# Patient Record
Sex: FEMALE | Race: WHITE | NOT HISPANIC OR LATINO | Employment: FULL TIME | URBAN - METROPOLITAN AREA
[De-identification: names, ages, dates, MRNs, and addresses within clinical notes are randomized per-mention and may not be internally consistent; named-entity substitution may affect disease eponyms.]

---

## 2023-07-08 ENCOUNTER — HOSPITAL ENCOUNTER (EMERGENCY)
Facility: CLINIC | Age: 28
Discharge: HOME | End: 2023-07-08
Attending: EMERGENCY MEDICINE | Admitting: EMERGENCY MEDICINE
Payer: COMMERCIAL

## 2023-07-08 VITALS
OXYGEN SATURATION: 100 % | HEIGHT: 63 IN | DIASTOLIC BLOOD PRESSURE: 79 MMHG | HEART RATE: 104 BPM | TEMPERATURE: 98.7 F | WEIGHT: 215 LBS | BODY MASS INDEX: 38.09 KG/M2 | SYSTOLIC BLOOD PRESSURE: 138 MMHG | RESPIRATION RATE: 14 BRPM

## 2023-07-08 DIAGNOSIS — F10.920 ALCOHOLIC INTOXICATION WITHOUT COMPLICATION (H): ICD-10-CM

## 2023-07-08 DIAGNOSIS — R41.82 ALTERED MENTAL STATUS, UNSPECIFIED ALTERED MENTAL STATUS TYPE: ICD-10-CM

## 2023-07-08 LAB
ALBUMIN SERPL BCG-MCNC: 4.5 G/DL (ref 3.5–5.2)
ALP SERPL-CCNC: 64 U/L (ref 35–104)
ALT SERPL W P-5'-P-CCNC: 21 U/L (ref 0–50)
ANION GAP SERPL CALCULATED.3IONS-SCNC: 19 MMOL/L (ref 7–15)
APAP SERPL-MCNC: <5 UG/ML (ref 10–30)
AST SERPL W P-5'-P-CCNC: 34 U/L (ref 0–45)
BASOPHILS # BLD MANUAL: 0 10E3/UL (ref 0–0.2)
BASOPHILS NFR BLD MANUAL: 0 %
BILIRUB SERPL-MCNC: <0.2 MG/DL
BUN SERPL-MCNC: 8.2 MG/DL (ref 6–20)
CALCIUM SERPL-MCNC: 9 MG/DL (ref 8.6–10)
CHLORIDE SERPL-SCNC: 104 MMOL/L (ref 98–107)
CREAT SERPL-MCNC: 0.58 MG/DL (ref 0.51–0.95)
DEPRECATED HCO3 PLAS-SCNC: 16 MMOL/L (ref 22–29)
EOSINOPHIL # BLD MANUAL: 0.3 10E3/UL (ref 0–0.7)
EOSINOPHIL NFR BLD MANUAL: 2 %
ERYTHROCYTE [DISTWIDTH] IN BLOOD BY AUTOMATED COUNT: 13.5 % (ref 10–15)
ETHANOL SERPL-MCNC: 0.37 G/DL
GFR SERPL CREATININE-BSD FRML MDRD: >90 ML/MIN/1.73M2
GLUCOSE SERPL-MCNC: 122 MG/DL (ref 70–99)
HCT VFR BLD AUTO: 46.1 % (ref 35–47)
HGB BLD-MCNC: 15.4 G/DL (ref 11.7–15.7)
HOLD SPECIMEN: NORMAL
LIPASE SERPL-CCNC: 31 U/L (ref 13–60)
LYMPHOCYTES # BLD MANUAL: 6.8 10E3/UL (ref 0.8–5.3)
LYMPHOCYTES NFR BLD MANUAL: 51 %
MCH RBC QN AUTO: 31.8 PG (ref 26.5–33)
MCHC RBC AUTO-ENTMCNC: 33.4 G/DL (ref 31.5–36.5)
MCV RBC AUTO: 95 FL (ref 78–100)
MONOCYTES # BLD MANUAL: 0.9 10E3/UL (ref 0–1.3)
MONOCYTES NFR BLD MANUAL: 7 %
NEUTROPHILS # BLD MANUAL: 5.4 10E3/UL (ref 1.6–8.3)
NEUTROPHILS NFR BLD MANUAL: 40 %
PLAT MORPH BLD: ABNORMAL
PLATELET # BLD AUTO: 553 10E3/UL (ref 150–450)
POTASSIUM SERPL-SCNC: 4.4 MMOL/L (ref 3.4–5.3)
PROT SERPL-MCNC: 8 G/DL (ref 6.4–8.3)
RBC # BLD AUTO: 4.85 10E6/UL (ref 3.8–5.2)
RBC MORPH BLD: ABNORMAL
SALICYLATES SERPL-MCNC: <0.3 MG/DL
SODIUM SERPL-SCNC: 139 MMOL/L (ref 136–145)
VARIANT LYMPHS BLD QL SMEAR: PRESENT
WBC # BLD AUTO: 13.4 10E3/UL (ref 4–11)

## 2023-07-08 PROCEDURE — 85027 COMPLETE CBC AUTOMATED: CPT | Performed by: EMERGENCY MEDICINE

## 2023-07-08 PROCEDURE — 36415 COLL VENOUS BLD VENIPUNCTURE: CPT | Performed by: EMERGENCY MEDICINE

## 2023-07-08 PROCEDURE — 80143 DRUG ASSAY ACETAMINOPHEN: CPT | Performed by: EMERGENCY MEDICINE

## 2023-07-08 PROCEDURE — 99283 EMERGENCY DEPT VISIT LOW MDM: CPT | Mod: 25

## 2023-07-08 PROCEDURE — 82077 ASSAY SPEC XCP UR&BREATH IA: CPT | Performed by: EMERGENCY MEDICINE

## 2023-07-08 PROCEDURE — 258N000003 HC RX IP 258 OP 636: Performed by: EMERGENCY MEDICINE

## 2023-07-08 PROCEDURE — 80179 DRUG ASSAY SALICYLATE: CPT | Performed by: EMERGENCY MEDICINE

## 2023-07-08 PROCEDURE — 96360 HYDRATION IV INFUSION INIT: CPT

## 2023-07-08 PROCEDURE — 80053 COMPREHEN METABOLIC PANEL: CPT | Performed by: EMERGENCY MEDICINE

## 2023-07-08 PROCEDURE — 83690 ASSAY OF LIPASE: CPT | Performed by: EMERGENCY MEDICINE

## 2023-07-08 PROCEDURE — 85007 BL SMEAR W/DIFF WBC COUNT: CPT | Performed by: EMERGENCY MEDICINE

## 2023-07-08 RX ORDER — LAMOTRIGINE 150 MG/1
150 TABLET ORAL 2 TIMES DAILY
COMMUNITY
Start: 2022-04-04

## 2023-07-08 RX ORDER — KETOCONAZOLE 20 MG/G
2 CREAM TOPICAL 2 TIMES DAILY
COMMUNITY
Start: 2022-04-04

## 2023-07-08 RX ORDER — SPIRONOLACTONE 50 MG/1
50 TABLET, FILM COATED ORAL 2 TIMES DAILY
COMMUNITY

## 2023-07-08 RX ORDER — CLONAZEPAM 0.5 MG/1
0.5 TABLET ORAL 3 TIMES DAILY PRN
COMMUNITY
Start: 2022-04-04

## 2023-07-08 RX ORDER — TRAZODONE HYDROCHLORIDE 50 MG/1
0.5 TABLET, FILM COATED ORAL AT BEDTIME
COMMUNITY
Start: 2022-04-04

## 2023-07-08 RX ORDER — RISPERIDONE 0.5 MG/1
0.5 TABLET ORAL DAILY
COMMUNITY
Start: 2022-04-04

## 2023-07-08 RX ADMIN — SODIUM CHLORIDE 1000 ML: 9 INJECTION, SOLUTION INTRAVENOUS at 02:48

## 2023-07-08 ASSESSMENT — ACTIVITIES OF DAILY LIVING (ADL)
ADLS_ACUITY_SCORE: 35

## 2023-07-08 NOTE — ED PROVIDER NOTES
ED ATTENDING PHYSICIAN NOTE:   I evaluated this patient in conjunction with Celsa Mathews PA-C  I have participated in the care of the patient and personally performed key elements of the history, exam, and medical decision making.      HPI:   Lola Patterson is a 28 year old female who presents to the ED via EMS for alcohol intoxication. EMS report the patient was out drinking with friends when the patient was acting increasingly more altered, prompting the friend to call EMS. On arrival, EMS found patient on the floor. She was given Versed and Zofran en route. The patient denies plans of self harm or drug use. Patient is quite somnolent on initial evaluation.     Independent Historian:   EMS - They report patient history.     EXAM:   General: sleeping but wakes to loud voice   Head: The scalp, face, and head appear normal. No signs of trauma   Eyes: The pupils are equal, round, and reactive to light. Conjunctivae and sclerae are normal  ENT: External acoustic canals are normal. The oropharynx is normal without erythema. Uvula is in the midline  Neck: Normal range of motion.   CV: Regular rate and rhythm.   Resp: Lungs are clear without wheezes or rales. No respiratory distress.   GI: Abdomen is soft, no rigidity, guarding, or rebound. No distension. No tenderness to palpation in any quadrant.    MS: Normal tone. Joints grossly normal without effusions. No asymmetric leg swelling, calf or thigh tenderness.    Skin: No rash or lesions noted. Normal capillary refill noted  Neuro: somnolent but wakes to voice and follows commands. Speech is slurred speech. Face is symmetric. Moving all extremities.   Psych:  Normal affect.  Appropriate interactions.    Independent Interpretation (X-rays, CTs, rhythm strip):  None    MEDICAL DECISION MAKING/ASSESSMENT AND PLAN:   Lola Patterson is a 28 year old female who presents for evaluation of altered mental status.  She is intoxicated here in ED and this is the most likely etiology  for their AMS.  Nonetheless a broad differential diagnosis was considered for the altered state including drug ingestion, infection, intracerebral issues, metabolic derangements, psychiatric decompensation.  Her head to toe exam is normal except for signs of alcohol intoxication. Blood work otherwise looks ok; no signs of alcoholic ketoacidosis, significant liver impairment or acute alcoholic hepatitis.  She has no history of DT's or alcohol withdrawal seizures. There are no signs of co-ingestion including acetaminophen, drugs, medications, volatile alcohols.  She has no signs of trauma related to alcohol use and no further workup is needed including head CT. Will require sober reassessment. Likely discharge once she metabolizes.     DIAGNOSIS:     ICD-10-CM    1. Alcoholic intoxication without complication (H)  F10.920       2. Altered mental status, unspecified altered mental status type  R41.82           DISPOSITION:   Signed out to oncoming ED provider pending sober reassessment      Scribe Disclosure:  I, Tahir Kern, am serving as a scribe at 1:13 AM on 7/8/2023 to document services personally performed by Terry Moore MD based on my observations and the provider's statements to me.      7/8/2023  [facility] EMERGENCY DEPT     Terry Moore MD  07/08/23 0522

## 2023-07-08 NOTE — ED NOTES
Bed: ED28  Expected date:   Expected time:   Means of arrival:   Comments:  MH - 28F ETOH ETA 00:00

## 2023-07-08 NOTE — ED TRIAGE NOTES
Patient was out drinking with friend whom called EMS, as patient was acting increasingly more altered. When EMS arrived, patient was on the floor as her friends helped lower her to the ground. Patient was anxious per EMS so that had given 1 mg versed and 4 mg zofran. Patient is maintaining her airway and answering questions appropriately but is quite lethargic. Per EMS, patient blew 0.27.

## 2023-07-08 NOTE — DISCHARGE INSTRUCTIONS
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
## 2023-07-08 NOTE — ED NOTES
Patient up at door attempted to exit from opposite end. Patient tearful, urinated on floor and appears to have a bloody nose. Patient does not know how she got the bloody nose. Provided patient with selma care, clean, disposable scrubs, water, socks, and fresh linen. Patient unsteady and still confused. Assisted patient back to bed to continue to rest.

## 2023-09-13 ENCOUNTER — TRANSFERRED RECORDS (OUTPATIENT)
Dept: HEALTH INFORMATION MANAGEMENT | Facility: CLINIC | Age: 28
End: 2023-09-13
Payer: COMMERCIAL

## 2023-09-20 ENCOUNTER — MEDICAL CORRESPONDENCE (OUTPATIENT)
Dept: HEALTH INFORMATION MANAGEMENT | Facility: CLINIC | Age: 28
End: 2023-09-20
Payer: COMMERCIAL

## 2023-09-21 ENCOUNTER — TRANSCRIBE ORDERS (OUTPATIENT)
Dept: OTHER | Age: 28
End: 2023-09-21

## 2023-09-21 DIAGNOSIS — R79.89 ELEVATED PROLACTIN LEVEL: Primary | ICD-10-CM

## 2023-10-22 ENCOUNTER — HEALTH MAINTENANCE LETTER (OUTPATIENT)
Age: 28
End: 2023-10-22

## 2024-09-19 ENCOUNTER — TRANSFERRED RECORDS (OUTPATIENT)
Dept: HEALTH INFORMATION MANAGEMENT | Facility: CLINIC | Age: 29
End: 2024-09-19
Payer: COMMERCIAL

## 2024-10-04 ENCOUNTER — TRANSFERRED RECORDS (OUTPATIENT)
Dept: HEALTH INFORMATION MANAGEMENT | Facility: CLINIC | Age: 29
End: 2024-10-04
Payer: COMMERCIAL

## 2024-10-08 ENCOUNTER — MEDICAL CORRESPONDENCE (OUTPATIENT)
Dept: HEALTH INFORMATION MANAGEMENT | Facility: CLINIC | Age: 29
End: 2024-10-08
Payer: COMMERCIAL

## 2024-10-08 ENCOUNTER — TRANSCRIBE ORDERS (OUTPATIENT)
Dept: OTHER | Age: 29
End: 2024-10-08

## 2024-10-08 DIAGNOSIS — R79.89 ELEVATED PROLACTIN LEVEL: Primary | ICD-10-CM

## 2024-12-15 ENCOUNTER — HEALTH MAINTENANCE LETTER (OUTPATIENT)
Age: 29
End: 2024-12-15

## 2025-01-14 ENCOUNTER — VIRTUAL VISIT (OUTPATIENT)
Dept: ENDOCRINOLOGY | Facility: CLINIC | Age: 30
End: 2025-01-14
Attending: ADVANCED PRACTICE MIDWIFE
Payer: COMMERCIAL

## 2025-01-14 VITALS — BODY MASS INDEX: 36.67 KG/M2 | WEIGHT: 207 LBS

## 2025-01-14 DIAGNOSIS — R79.89 ELEVATED PROLACTIN LEVEL: ICD-10-CM

## 2025-01-14 ASSESSMENT — PAIN SCALES - GENERAL: PAINLEVEL_OUTOF10: NO PAIN (0)

## 2025-01-14 NOTE — LETTER
"1/14/2025      Lola Patterson  [id number]  Replaced by [facility] [id number]      Dear Colleague,    Thank you for referring your patient, Lola Patterson, to the [facility]. Please see a copy of my visit note below.    THIS IS A VIDEO VISIT:    Phone call visit/virtual visit encounter:    Name of patient: Lola Patterson    Date of encounter: 1/14/2025    Time of start of video visit: 11:30    Video started: 11:41    Video ended: 11:51    Provider location: working from home/ [facility]    Patient location: patients home.    Mode of transmission: [employer]AM[employer] video/ [employer]    Verbal consent: obtained before starting visit. Pt is agreeable.      The patient has been notified of following:      \"This VIDEO visit will be conducted via a call between you and your physician/provider. We have found that certain health care needs can be provided without the need for a physical exam.  This service lets us provide the care you need with a short phone conversation.  If a prescription is necessary we can send it directly to your pharmacy.  If lab work is needed we can place an order for that and you can then stop by our lab to have the test done at a later time.     With new updates with corona virus patient might be billed as clinic visit.     If during the course of the call the physician/provider feels a telephone visit is not appropriate, you will not be charged for this service.\"      Past medical history, social history, family history, allergy and medications were reviewed and updated as appropriate.  Reviewed pertinent labs, notes, imaging studies personally.    Name: Lola Patterson  Seen in consultation with Linda Chilel APRN CNM  for elevated prolactin.  HPI:  Lola Patterson is a 30 year old female who presents for the evaluation of hyperprolactinemia .  Referring provider Linda Chilel APRN CNM is outside [place].  Available records, labs and images from outside clinic were personally " reviewed.    H/o PCOS and amenorrhea. She is on spironolactone.  Noted high prolactin X 2 ( in 2023 level was 84.3 and in 2024 was 60.1)      Breast discharge: no. In 2023 has one time breast discharge  No breast enlargement or pain.  No FH of breast Ca.  Menses: irregular. LMP Dec 2021. She is on Slynd (Drospirenone) and she is on it since 2022.  Changes in periods: irrergular  Pregnancy desire: No plans. No kids  Changes in vision: sometimes blurry. Wears glasses. No peripheral vision problems.  Headaches: present. Frontal HA. No h/o migraines  Medications: RISPERIDONE since 2021 for bipolar disorder.  She has metal in leg. H/o femur fracture.  PMH/PSH:  No past medical history on file.  No past surgical history on file.  Family Hx:  No family history on file.    Social Hx:  Social History     Socioeconomic History     Marital status: Single     Spouse name: Not on file     Number of children: Not on file     Years of education: Not on file     Highest education level: Not on file   Occupational History     Not on file   Tobacco Use     Smoking status: Former     Current packs/day: 0.00     Types: Cigarettes     Quit date: 10/1/2021     Years since quitting: 3.2     Passive exposure: Never     Smokeless tobacco: Former     Quit date: 5/6/2023   Substance and Sexual Activity     Alcohol use: Not on file     Drug use: Not on file     Sexual activity: Not on file   Other Topics Concern     Not on file   Social History Narrative     Not on file     Social Drivers of Health     Financial Resource Strain: Not on file   Food Insecurity: Not on file   Transportation Needs: Not on file   Physical Activity: Not on file   Stress: Not on file   Social Connections: Unknown (4/4/2022)    [facility]    Social Connections      Frequency of Communication with Friends and Family: Not on file   Interpersonal Safety: Not on file   Housing  "Stability: Not on file          MEDICATIONS:  has a current medication list which includes the following prescription(s): drospirenone, lamotrigine, risperidone, spironolactone, clonazepam, ketoconazole, and trazodone.      ROS     ROS: 10 point ROS neg other than the symptoms noted above in the HPI.    Physical Exam   VS: Wt 93.9 kg (207 lb)   BMI 36.67 kg/m    GENERAL: healthy, alert and no distress  EYES: Eyes grossly normal to inspection, conjunctivae and sclerae normal  ENT: no nose swelling, nasal discharge.  Thyroid: no apparent thyroid nodules  RESP: no audible wheeze, cough, or visible cyanosis.  No visible retractions or increased work of breathing.  Able to speak fully in complete sentences.  ABDO: not evaluated.  EXTREMITIES: no hand tremors.  NEURO: Cranial nerves grossly intact, mentation intact and speech normal  SKIN: No apparent skin lesions, rash or edema seen   PSYCH: mentation appears normal, affect normal/bright, judgement and insight intact, normal speech and appearance well-groomed    LABS:    All pertinent notes, labs, and images personally reviewed by me.     PROLACTIN:  high prolactin X 2 ( in 2023 level was 84.3 and in 2024 was 60.1)    TFTs:  No results found for: \"TSH\"        A/P  Ms.Rachel ARLEEN Patterson is a 30 year old here for the evaluation of elevated prolactin:    Elevated prolactin level:  Comment: H/o PCOS and amenorrhea. She is on spironolactone.  Noted high prolactin X 2 ( in 2023 level was 84.3 and in 2024 was 60.1)  She is on risperidone for last few years.  No breast pain, breast discharge  Plan:   Discussed diagnosis, pathophysiology, management and treatment options of condition with pt.  I discussed hyperprolactinemia with patient.  Risperidone can cause high prolactin level.  Clinically no major concerns.  Plan to check labs as noted below  Based on that consider MRI (of note she has metal in her leg a for femur fracture)  Consider talking to psychiatrist about switching to " different medication    Plan: TSH with free T4 reflex, Prolactin          Prolactin secreting pituitary tumors are the most common type of hormone secreting pituitary tumors.     Causes: Hypothyroidism can be associated with hyperprolactinemia. Hypothyroidism results in increased TRH production, then TRH (which can act as a PRF) could lead to hyperprolactinemia. Estrogens act directly at the pituitary level, causing stimulation of lactotrophs, and thus enhance prolactin secretion. Injury to the chest wall can also lead to hyperprolactinemia; this results from abnormal stimulation of the reflex associated with the rise in prolactin seen in suckling. Certain drugs act as dopamine-receptor-blocking agents, including phenothiazines (e.g. chlorpromazine), butyrophenones (haloperidol), and benzamides (metoclopramide, sulpiride, and domperidone). These drugs block the effects of endogenous dopamine and thus release lactotrophs from their hypothalamic inhibition. Pituitary or stalk tumors with abnormal blood supplies, or their pressure effects, may interfere with the circulatory pathway from the hypothalamus down the pituitary stalk to the normal lactotrophs or a tumor, producing effective dopamine deficiency due to a functional stalk section. Diseases of the hypothalamus, such as tumors, arterio-venous malformations, and inflammatory processes such as sarcoidosis result in either diminished synthesis or release of dopamine. Certain drugs (e.g. alpha-methyldopa and reserpine) are capable of depleting the central dopamine stores.     The symptoms associated with hyperprolactinemia may be due to several factors: the direct effects of excess prolactin à galactorrhea or hypogonadism; the effects of the structural lesion causing the disorder (i.e. the pituitary tumor) à  headaches, visual field defects, or external ophthalmoplegia; or associated dysfunction of secretion of other anterior pituitary hormones.     Women with  hyperprolactinemia usually present with menstrual abnormalities - amenorrhea or oligomenorrhea - or regular cycles with infertility or menorrhagia. Men often present late in the course of the disease with symptoms of expansion of their pituitary tumor (i.e. headaches, visual defects, and external ophthalmoplegia) or symptoms from secondary adrenal or thyroid failure.     A microadenoma has a maximum diameter of up to 10mm, and a macroadenoma as having a diameter > 10mm. A microadenoma has serum prolactin levels below 200ng/mL.  A macroadenoma that secretes prolactin is usually associated with a serum prolactin level of more than 200ng/mL.    When serum prolactin is evaluated by two-site immunometric assays, large amounts of prolactin saturate both the capture and the signal antibodies, impairing their binding, causing serum prolactin to be underestimated (hook effect).   In order to avoid unnecessary surgery (treatment of choice for nonfunctioning tumors), prolactin assays with serum dilution are recommended in patients with macroadenomas who may harbor a prolactinoma.    The anatomy of the pituitary is optimally assessed by MRI. MRI allows imaging of the optic chiasm, the cavernous sinuses, the pituitary (both the normal gland and tumors), and its stalk. MRI allows accurate measurement of the size of the pituitary and of any tumor and its relationship to the optic chiasm and cavernous sinuses.     Cabergoline has an extremely long biological half life and thus only needs to be administered either once or twice per week at a dose of 0.5 mg/dose.  Cabergoline is generally better tolerated than bromocriptine, so increasing the patient's adherence. Therefore, cabergoline is currently considered the first choice drug for the treatment of prolactinomas, except for patients wishing pregnancy in the short-term. Bromocriptine reduces pituitary tumor size in 75-80% of patients with large prolactin-secreting tumors, even with  gross extrasellar extension.    Discussed indications, risks and benefits of all medications prescribed, and answered questions to patient's satisfaction.  The longitudinal plan of care for the diagnosis(es)/condition(s) as documented were addressed during this visit. Due to the added complexity in care, I will continue to support Lola in the subsequent management and with ongoing continuity of care.  All questions were answered.  The patient indicates understanding of the above issues and agrees with the plan set forth.      Follow-up:  As noted in AVS    Jazz Patel MD  Endocrinology   [facility]  CC: No Ref-Primary, Physician            Again, thank you for allowing me to participate in the care of your patient.        Sincerely,        Jazz Patel MD    Electronically signed

## 2025-01-14 NOTE — PROGRESS NOTES
"THIS IS A VIDEO VISIT:    Phone call visit/virtual visit encounter:    Name of patient: Lola Patterson    Date of encounter: 1/14/2025    Time of start of video visit: 11:30    Video started: 11:41    Video ended: 11:51    Provider location: working from home/ [facility]    Patient location: patients home.    Mode of transmission: [employer] video/ [employer]    Verbal consent: obtained before starting visit. Pt is agreeable.      The patient has been notified of following:      \"This VIDEO visit will be conducted via a call between you and your physician/provider. We have found that certain health care needs can be provided without the need for a physical exam.  This service lets us provide the care you need with a short phone conversation.  If a prescription is necessary we can send it directly to your pharmacy.  If lab work is needed we can place an order for that and you can then stop by our lab to have the test done at a later time.     With new updates with corona virus patient might be billed as clinic visit.     If during the course of the call the physician/provider feels a telephone visit is not appropriate, you will not be charged for this service.\"      Past medical history, social history, family history, allergy and medications were reviewed and updated as appropriate.  Reviewed pertinent labs, notes, imaging studies personally.    Name: Lola Patterson  Seen in consultation with Linda Chilel APRN CNM  for elevated prolactin.  HPI:  Lola Patterson is a 30 year old female who presents for the evaluation of hyperprolactinemia .  Referring provider Linda Chilel APRN CNM is outside [place].  Available records, labs and images from outside clinic were personally reviewed.    H/o PCOS and amenorrhea. She is on spironolactone.  Noted high prolactin X 2 ( in 2023 level was 84.3 and in 2024 was 60.1)      Breast discharge: no. In 2023 has one time breast discharge  No breast enlargement or pain.  No FH of " breast Ca.  Menses: irregular. LMP Dec 2021. She is on Slynd (Drospirenone) and she is on it since 2022.  Changes in periods: irrergular  Pregnancy desire: No plans. No kids  Changes in vision: sometimes blurry. Wears glasses. No peripheral vision problems.  Headaches: present. Frontal HA. No h/o migraines  Medications: RISPERIDONE since 2021 for bipolar disorder.  She has metal in leg. H/o femur fracture.  PMH/PSH:  No past medical history on file.  No past surgical history on file.  Family Hx:  No family history on file.    Social Hx:  Social History     Socioeconomic History    Marital status: Single     Spouse name: Not on file    Number of children: Not on file    Years of education: Not on file    Highest education level: Not on file   Occupational History    Not on file   Tobacco Use    Smoking status: Former     Current packs/day: 0.00     Types: Cigarettes     Quit date: 10/1/2021     Years since quitting: 3.2     Passive exposure: Never    Smokeless tobacco: Former     Quit date: 5/6/2023   Substance and Sexual Activity    Alcohol use: Not on file    Drug use: Not on file    Sexual activity: Not on file   Other Topics Concern    Not on file   Social History Narrative    Not on file     Social Drivers of Health     Financial Resource Strain: Not on file   Food Insecurity: Not on file   Transportation Needs: Not on file   Physical Activity: Not on file   Stress: Not on file   Social Connections: Unknown (4/4/2022)    [facility]    Social Connections     Frequency of Communication with Friends and Family: Not on file   Interpersonal Safety: Not on file   Housing Stability: Not on file          MEDICATIONS:  has a current medication list which includes the following prescription(s): drospirenone, lamotrigine, risperidone, spironolactone, clonazepam, ketoconazole, and trazodone.      ROS     ROS: 10 point ROS neg other  "than the symptoms noted above in the HPI.    Physical Exam   VS: Wt 93.9 kg (207 lb)   BMI 36.67 kg/m    GENERAL: healthy, alert and no distress  EYES: Eyes grossly normal to inspection, conjunctivae and sclerae normal  ENT: no nose swelling, nasal discharge.  Thyroid: no apparent thyroid nodules  RESP: no audible wheeze, cough, or visible cyanosis.  No visible retractions or increased work of breathing.  Able to speak fully in complete sentences.  ABDO: not evaluated.  EXTREMITIES: no hand tremors.  NEURO: Cranial nerves grossly intact, mentation intact and speech normal  SKIN: No apparent skin lesions, rash or edema seen   PSYCH: mentation appears normal, affect normal/bright, judgement and insight intact, normal speech and appearance well-groomed    LABS:    All pertinent notes, labs, and images personally reviewed by me.     PROLACTIN:  high prolactin X 2 ( in 2023 level was 84.3 and in 2024 was 60.1)    TFTs:  No results found for: \"TSH\"        A/P  Ms.Rachel ARLEEN Patterson is a 30 year old here for the evaluation of elevated prolactin:    Elevated prolactin level:  Comment: H/o PCOS and amenorrhea. She is on spironolactone.  Noted high prolactin X 2 ( in 2023 level was 84.3 and in 2024 was 60.1)  She is on risperidone for last few years.  No breast pain, breast discharge  Plan:   Discussed diagnosis, pathophysiology, management and treatment options of condition with pt.  I discussed hyperprolactinemia with patient.  Risperidone can cause high prolactin level.  Clinically no major concerns.  Plan to check labs as noted below  Based on that consider MRI (of note she has metal in her leg a for femur fracture)  Consider talking to psychiatrist about switching to different medication    Plan: TSH with free T4 reflex, Prolactin          Prolactin secreting pituitary tumors are the most common type of hormone secreting pituitary tumors.     Causes: Hypothyroidism can be associated with hyperprolactinemia. Hypothyroidism " results in increased TRH production, then TRH (which can act as a PRF) could lead to hyperprolactinemia. Estrogens act directly at the pituitary level, causing stimulation of lactotrophs, and thus enhance prolactin secretion. Injury to the chest wall can also lead to hyperprolactinemia; this results from abnormal stimulation of the reflex associated with the rise in prolactin seen in suckling. Certain drugs act as dopamine-receptor-blocking agents, including phenothiazines (e.g. chlorpromazine), butyrophenones (haloperidol), and benzamides (metoclopramide, sulpiride, and domperidone). These drugs block the effects of endogenous dopamine and thus release lactotrophs from their hypothalamic inhibition. Pituitary or stalk tumors with abnormal blood supplies, or their pressure effects, may interfere with the circulatory pathway from the hypothalamus down the pituitary stalk to the normal lactotrophs or a tumor, producing effective dopamine deficiency due to a functional stalk section. Diseases of the hypothalamus, such as tumors, arterio-venous malformations, and inflammatory processes such as sarcoidosis result in either diminished synthesis or release of dopamine. Certain drugs (e.g. alpha-methyldopa and reserpine) are capable of depleting the central dopamine stores.     The symptoms associated with hyperprolactinemia may be due to several factors: the direct effects of excess prolactin à galactorrhea or hypogonadism; the effects of the structural lesion causing the disorder (i.e. the pituitary tumor) à  headaches, visual field defects, or external ophthalmoplegia; or associated dysfunction of secretion of other anterior pituitary hormones.     Women with hyperprolactinemia usually present with menstrual abnormalities - amenorrhea or oligomenorrhea - or regular cycles with infertility or menorrhagia. Men often present late in the course of the disease with symptoms of expansion of their pituitary tumor (i.e.  headaches, visual defects, and external ophthalmoplegia) or symptoms from secondary adrenal or thyroid failure.     A microadenoma has a maximum diameter of up to 10mm, and a macroadenoma as having a diameter > 10mm. A microadenoma has serum prolactin levels below 200ng/mL.  A macroadenoma that secretes prolactin is usually associated with a serum prolactin level of more than 200ng/mL.    When serum prolactin is evaluated by two-site immunometric assays, large amounts of prolactin saturate both the capture and the signal antibodies, impairing their binding, causing serum prolactin to be underestimated (hook effect).   In order to avoid unnecessary surgery (treatment of choice for nonfunctioning tumors), prolactin assays with serum dilution are recommended in patients with macroadenomas who may harbor a prolactinoma.    The anatomy of the pituitary is optimally assessed by MRI. MRI allows imaging of the optic chiasm, the cavernous sinuses, the pituitary (both the normal gland and tumors), and its stalk. MRI allows accurate measurement of the size of the pituitary and of any tumor and its relationship to the optic chiasm and cavernous sinuses.     Cabergoline has an extremely long biological half life and thus only needs to be administered either once or twice per week at a dose of 0.5 mg/dose.  Cabergoline is generally better tolerated than bromocriptine, so increasing the patient's adherence. Therefore, cabergoline is currently considered the first choice drug for the treatment of prolactinomas, except for patients wishing pregnancy in the short-term. Bromocriptine reduces pituitary tumor size in 75-80% of patients with large prolactin-secreting tumors, even with gross extrasellar extension.    Discussed indications, risks and benefits of all medications prescribed, and answered questions to patient's satisfaction.  The longitudinal plan of care for the diagnosis(es)/condition(s) as documented were addressed during  this visit. Due to the added complexity in care, I will continue to support Lola in the subsequent management and with ongoing continuity of care.  All questions were answered.  The patient indicates understanding of the above issues and agrees with the plan set forth.      Follow-up:  As noted in AVS    Jazz Patel MD  Endocrinology   [facility]  CC: No Ref-Primary, Physician

## 2025-01-21 ENCOUNTER — LAB (OUTPATIENT)
Dept: LAB | Facility: CLINIC | Age: 30
End: 2025-01-21
Payer: COMMERCIAL

## 2025-01-21 DIAGNOSIS — R79.89 ELEVATED PROLACTIN LEVEL: ICD-10-CM

## 2025-01-21 PROCEDURE — 84146 ASSAY OF PROLACTIN: CPT

## 2025-01-21 PROCEDURE — 36415 COLL VENOUS BLD VENIPUNCTURE: CPT

## 2025-01-21 PROCEDURE — 84443 ASSAY THYROID STIM HORMONE: CPT

## 2025-01-22 LAB
PROLACTIN SERPL 3RD IS-MCNC: 17 NG/ML (ref 5–23)
TSH SERPL DL<=0.005 MIU/L-ACNC: 1.15 UIU/ML (ref 0.3–4.2)